# Patient Record
Sex: FEMALE | Race: WHITE | NOT HISPANIC OR LATINO | Employment: FULL TIME | URBAN - METROPOLITAN AREA
[De-identification: names, ages, dates, MRNs, and addresses within clinical notes are randomized per-mention and may not be internally consistent; named-entity substitution may affect disease eponyms.]

---

## 2019-06-28 ENCOUNTER — OFFICE VISIT (OUTPATIENT)
Dept: PODIATRY | Facility: CLINIC | Age: 39
End: 2019-06-28
Payer: COMMERCIAL

## 2019-06-28 VITALS
RESPIRATION RATE: 18 BRPM | TEMPERATURE: 98 F | HEIGHT: 69 IN | BODY MASS INDEX: 21.48 KG/M2 | HEART RATE: 68 BPM | OXYGEN SATURATION: 97 % | WEIGHT: 145 LBS

## 2019-06-28 DIAGNOSIS — M77.52 BURSITIS OF HEEL, LEFT: ICD-10-CM

## 2019-06-28 DIAGNOSIS — B07.0 PLANTAR WART, LEFT FOOT: Primary | ICD-10-CM

## 2019-06-28 PROCEDURE — 99202 OFFICE O/P NEW SF 15 MIN: CPT | Mod: S$GLB,,, | Performed by: PODIATRIST

## 2019-06-28 PROCEDURE — 99999 PR PBB SHADOW E&M-NEW PATIENT-LVL III: ICD-10-PCS | Mod: PBBFAC,,, | Performed by: PODIATRIST

## 2019-06-28 PROCEDURE — 3008F BODY MASS INDEX DOCD: CPT | Mod: S$GLB,,, | Performed by: PODIATRIST

## 2019-06-28 PROCEDURE — 99999 PR PBB SHADOW E&M-NEW PATIENT-LVL III: CPT | Mod: PBBFAC,,, | Performed by: PODIATRIST

## 2019-06-28 PROCEDURE — 99202 PR OFFICE/OUTPT VISIT, NEW, LEVL II, 15-29 MIN: ICD-10-PCS | Mod: S$GLB,,, | Performed by: PODIATRIST

## 2019-06-28 PROCEDURE — 3008F PR BODY MASS INDEX (BMI) DOCUMENTED: ICD-10-PCS | Mod: S$GLB,,, | Performed by: PODIATRIST

## 2019-07-01 NOTE — PROGRESS NOTES
Subjective:      Patient ID: Emili Uribe is a 39 y.o. female.    Chief Complaint: Plantar Warts  Patient presents with complaint of very painful plantar wart left foot.  She states this started February/March of this year, has progressed and is very sore just to touch.  She has tried over-the-counter treatments, PCP has frozen 4 times, no improvement.  She inquires about having it removed.      ROS     Constitutional    Pleasant, well-nourished, no distress, well oriented    Cardiovascular          No chest pain, no shortness of breath,  no swelling in the extremities    Respiratory           No cough, no congestion     Musculoskeletal        No muscle aches, no arthralgias/joint pain, PAIN LEFT HEEL    Neurologic         No weakness, no numbness        Objective:      Physical Exam   Cardiovascular:   Pulses:       Dorsalis pedis pulses are 2+ on the right side, and 2+ on the left side.        Posterior tibial pulses are 2+ on the right side, and 2+ on the left side.   Musculoskeletal:        Right foot: There is no deformity.        Left foot: There is no deformity.   Vascular         Normal CFT bilateral   Pedal skin temperature and color are normal bilateral     Integumentary   Positive pedal edema surrounding/ underlying plantar wart left heel causing a painful bursitis of the area.  Mild hyperkeratotic tissue, area so sensitive, unable to debride adequately.  Lesion is circular, relaxed skin tension lines deviate the area, no erythema or calor  No similar lesions bilateral feet  Skin is in good condition          Neurological   Gross sensation intact bilateral      Musculoskeletal   Muscle Strength/Testing and Tone:  Intact, normal tone bilateral   Joints, Bones, and Muscles: Normal with normal ROM                    Assessment:       Encounter Diagnoses   Name Primary?    Plantar wart, left foot Yes    Bursitis of heel, left          Plan:       Emili was seen today for plantar warts.    Diagnoses and  all orders for this visit:    Plantar wart, left foot    Bursitis of heel, left      Attempted to debride plantar verruca left heel, 2 painful.  Reviewed the etiology and pathology regarding warts with patient.  Advised these are typically not painful unless they become inflamed.  We had a lengthy discussion regarding bursitis of the area.  Explained the patient this is treated with anti-inflammatories.  We discussed over-the-counter oral anti-inflammatory as directed, with food x1 week, discontinue with stomach upset.  Explained to patient best topical treatment to reduce inflammation and pain is I/cool therapy.  We discussed frequency this should be performed.  Also recommended over-the-counter Aspercreme with lidocaine as directed.  Advised patient these treatments can be significantly helpful in reducing pain, swelling and inflammation.  At that point this area can be debrided properly.  Explained the patient due to this location simply freezing the area will not resolve the wart, it has to be aggressively debrided.  Patient confirms area was never debrided before freezing.  Explained the patient conservative treatments would be debriding the area and  Freezing in the office.  We discussed surgical intervention, outpatient surgery, care of the wound left after wart is removed, utilizing surgical shoe or boot.  Recommended surgical consult.  Explained patient if she is doing well and wants to continue conservative treatment at that time she can, or discussed surgical removal.  Patient was in understanding and agreement with treatment plan.  She stated if pain was resolved, she may not even consider having the wart removed.  Encouraged patient to discuss treatment  At time of surgical consult  depending on results of treating bursitis.   Explained the patient if oral and topical anti-inflammatories as well as ice are very successful in reducing her pain over the next few days she can start to treat this area with  over-the-counter treatments, explained patient how to apply for this location. Patient verbalized understanding  Counseled the patient on her conditions, their implications and medical management.  Counseled patient on conditions, their implications and medical management.  Instructed patient to contact the office with any changes, questions, concerns, worsening of symptoms. Patient verbalized understanding.   Total face to face time, exam, assessment, treatment, discussion, documentation 20 minutes, more than half this time spent on consultation and coordination of care.   Follow up   7/15.  Surgical consult Dr. Sincere Corral    This note was created using [employer] ([employer] voice recognition software that occasionally misinterpreted phrases or words.

## 2019-07-15 ENCOUNTER — OFFICE VISIT (OUTPATIENT)
Dept: PODIATRY | Facility: CLINIC | Age: 39
End: 2019-07-15
Payer: COMMERCIAL

## 2019-07-15 VITALS
WEIGHT: 145 LBS | DIASTOLIC BLOOD PRESSURE: 68 MMHG | HEART RATE: 70 BPM | SYSTOLIC BLOOD PRESSURE: 96 MMHG | TEMPERATURE: 98 F | BODY MASS INDEX: 21.48 KG/M2 | HEIGHT: 69 IN

## 2019-07-15 DIAGNOSIS — B07.9 WARTS OF FOOT: Primary | ICD-10-CM

## 2019-07-15 PROCEDURE — 99213 OFFICE O/P EST LOW 20 MIN: CPT | Mod: 25,S$GLB,, | Performed by: PODIATRIST

## 2019-07-15 PROCEDURE — 17110 DESTRUCTION B9 LES UP TO 14: CPT | Mod: LT,S$GLB,, | Performed by: PODIATRIST

## 2019-07-15 PROCEDURE — 99213 PR OFFICE/OUTPT VISIT, EST, LEVL III, 20-29 MIN: ICD-10-PCS | Mod: 25,S$GLB,, | Performed by: PODIATRIST

## 2019-07-15 PROCEDURE — 17110 PR DESTRUCTION BENIGN LESIONS UP TO 14: ICD-10-PCS | Mod: LT,S$GLB,, | Performed by: PODIATRIST

## 2019-07-15 PROCEDURE — 3008F BODY MASS INDEX DOCD: CPT | Mod: S$GLB,,, | Performed by: PODIATRIST

## 2019-07-15 PROCEDURE — 3008F PR BODY MASS INDEX (BMI) DOCUMENTED: ICD-10-PCS | Mod: S$GLB,,, | Performed by: PODIATRIST

## 2019-07-15 PROCEDURE — 99999 PR PBB SHADOW E&M-EST. PATIENT-LVL III: CPT | Mod: PBBFAC,,, | Performed by: PODIATRIST

## 2019-07-15 PROCEDURE — 99999 PR PBB SHADOW E&M-EST. PATIENT-LVL III: ICD-10-PCS | Mod: PBBFAC,,, | Performed by: PODIATRIST

## 2019-07-15 NOTE — LETTER
July 20, 2019      [facility]  [address]  [place] [address]           [facility] - [facility]iat[facility]ound [facility]  [address] [place] [address] [place] [address]  Phone: [phone]  Fax: [phone]          Patient: Emili Uribe   MR Number: [id number]   YOB: 1980   Date of Visit: 7/15/2019       Dear [facility]:    Thank you for referring Emili Uribe to me for evaluation. Attached you will find relevant portions of my assessment and plan of care.    If you have questions, please do not hesitate to call me. I look forward to following Emili Uribe along with you.    Sincerely,    Sincere Corral, DPPALLAVI    Enclosure  CC:  No Recipients    If you would like to receive this communication electronically, please contact [email or web address] or [phone] to request more information on [employer] Link access.    For providers and/or their staff who would like to refer a patient to [facility], please contact us through our one-stop-shop provider referral line, [facility], at [phone].    If you feel you have received this communication in error or would no longer like to receive these types of communications, please e-mail [email or web address]

## 2019-07-20 PROBLEM — B07.9 WARTS OF FOOT: Status: ACTIVE | Noted: 2019-07-20

## 2019-07-20 NOTE — PROGRESS NOTES
Subjective:      Patient ID: Emili Uribe is a 39 y.o. female.    Chief Complaint: Follow-up (plantars wart heel ) and Foot Problem (left foot heel )  Patient presents with complaint of very painful plantar wart left foot.  She states this started February/March of this year, has progressed and is very sore just to touch.  She has tried over-the-counter treatments, PCP has frozen 4 times, no improvement.  She inquires about having it removed.      Review of Systems   Skin: Positive for suspicious lesions.   All other systems reviewed and are negative.       Constitutional    Pleasant, well-nourished, no distress, well oriented    Cardiovascular          No chest pain, no shortness of breath,  no swelling in the extremities    Respiratory           No cough, no congestion     Musculoskeletal        No muscle aches, no arthralgias/joint pain, PAIN LEFT HEEL    Neurologic         No weakness, no numbness        Objective:      Physical Exam   Constitutional: She appears well-developed and well-nourished.   Cardiovascular:   Pulses:       Dorsalis pedis pulses are 2+ on the right side, and 2+ on the left side.        Posterior tibial pulses are 2+ on the right side, and 2+ on the left side.   Pulmonary/Chest: Effort normal.   Musculoskeletal: Normal range of motion.   Feet:   Right Foot:   Protective Sensation: 4 sites tested. 4 sites sensed.   Skin Integrity: Positive for callus and dry skin.   Left Foot:   Protective Sensation: 4 sites tested. 4 sites sensed.   Skin Integrity: Positive for callus and dry skin.   Neurological: She is alert.   Skin: Skin is warm. Capillary refill takes less than 2 seconds.   Psychiatric: She has a normal mood and affect. Her behavior is normal. Judgment and thought content normal.   Nursing note and vitals reviewed.  Vascular         Normal CFT bilateral   Pedal skin temperature and color are normal bilateral     Integumentary   Positive pedal edema surrounding/ underlying plantar  wart left heel causing a painful bursitis of the area.  Mild hyperkeratotic tissue, area so sensitive, unable to debride adequately.  Lesion is circular, relaxed skin tension lines deviate the area, no erythema or calor  No similar lesions bilateral feet  Skin is in good condition          Neurological   Gross sensation intact bilateral      Musculoskeletal   Muscle Strength/Testing and Tone:  Intact, normal tone bilateral   Joints, Bones, and Muscles: Normal with normal ROM                        Assessment:       Encounter Diagnosis   Name Primary?    Warts of foot Yes         Plan:       Emili was seen today for follow-up and foot problem.    Diagnoses and all orders for this visit:    Warts of foot      Patient presents today for follow-up of a wart on the plantar surface of the patient's left foot patient has been referred to me for surgical consultation of this wart that has been present since March.  Previously the area was not able to be debrided because of sensitivity it is not as sensitive today I did non excisionally debride the wart today advising the patient is approximately 1 cm by 8 mm in size it is tender.  Following evaluation and discussion surgical consultation was provided today I advised the patient what would need to be done surgically to remove this however I have also made her aware that she would likely need to be off of her foot for 3-4 weeks postop and this was very concerning to the patient because she works she is on her feet a lot following this I did recommend further conservative treatment patient had this frozen at other office is not our office I have recommended freezing this today patient was in understanding and agreement with this we are going to try to treat this conservatively the area was frozen with the cryoprobe I plan to see her for follow-up in 3 weeks we will pursue surgical intervention only if absolutely necessary and the patient is not responding to conservative  treatment.  Separate evaluation and management performed today as this is the initial time that I have seen the patient I provided surgical consultation and discussion regarding treatment.    This note was created using [employer] voice recognition software that occasionally misinterpreted phrases or words.

## 2019-08-05 ENCOUNTER — OFFICE VISIT (OUTPATIENT)
Dept: PODIATRY | Facility: CLINIC | Age: 39
End: 2019-08-05
Payer: COMMERCIAL

## 2019-08-05 VITALS
WEIGHT: 145 LBS | RESPIRATION RATE: 18 BRPM | TEMPERATURE: 98 F | HEART RATE: 75 BPM | OXYGEN SATURATION: 99 % | HEIGHT: 69 IN | DIASTOLIC BLOOD PRESSURE: 73 MMHG | SYSTOLIC BLOOD PRESSURE: 102 MMHG | BODY MASS INDEX: 21.48 KG/M2

## 2019-08-05 DIAGNOSIS — B07.9 WARTS OF FOOT: Primary | ICD-10-CM

## 2019-08-05 PROCEDURE — 99499 UNLISTED E&M SERVICE: CPT | Mod: S$GLB,,, | Performed by: PODIATRIST

## 2019-08-05 PROCEDURE — 17110 DESTRUCTION B9 LES UP TO 14: CPT | Mod: S$GLB,,, | Performed by: PODIATRIST

## 2019-08-05 PROCEDURE — 99499 NO LOS: ICD-10-PCS | Mod: S$GLB,,, | Performed by: PODIATRIST

## 2019-08-05 PROCEDURE — 99999 PR PBB SHADOW E&M-EST. PATIENT-LVL III: CPT | Mod: PBBFAC,,, | Performed by: PODIATRIST

## 2019-08-05 PROCEDURE — 17110 PR DESTRUCTION BENIGN LESIONS UP TO 14: ICD-10-PCS | Mod: S$GLB,,, | Performed by: PODIATRIST

## 2019-08-05 PROCEDURE — 99999 PR PBB SHADOW E&M-EST. PATIENT-LVL III: ICD-10-PCS | Mod: PBBFAC,,, | Performed by: PODIATRIST

## 2019-08-05 NOTE — LETTER
August 10, 2019      [facility]  [address]  [place] [address]           [facility]ound [facility]  [address] [place] [address] [place] [address]  Phone: [phone]  Fax: [phone]          Patient: Emili Uribe   MR Number: [id number]   YOB: 1980   Date of Visit: 8/5/2019       Dear [facility]:    Thank you for referring Emili Uribe to me for evaluation. Attached you will find relevant portions of my assessment and plan of care.    If you have questions, please do not hesitate to call me. I look forward to following Emili Uribe along with you.    Sincerely,    Sincere Corral, DPPALLAVI    Enclosure  CC:  No Recipients    If you would like to receive this communication electronically, please contact [email or web address] or [phone] to request more information on [employer] access.    For providers and/or their staff who would like to refer a patient to [facility], please contact us through our one-stop-shop provider referral line, [facility], at [phone].    If you feel you have received this communication in error or would no longer like to receive these types of communications, please e-mail [email or web address]

## 2019-08-10 NOTE — PROGRESS NOTES
Subjective:      Patient ID: Emili Uribe is a 39 y.o. female.    Chief Complaint: Follow-up  Patient presents with complaint of very painful plantar wart left foot.  She states this started February/March of this year, has progressed and is very sore just to touch.  She has tried over-the-counter treatments, PCP has frozen 4 times, no improvement.  She inquires about having it removed.      Review of Systems   Skin: Positive for suspicious lesions.   All other systems reviewed and are negative.       Constitutional    Pleasant, well-nourished, no distress, well oriented    Cardiovascular          No chest pain, no shortness of breath,  no swelling in the extremities    Respiratory           No cough, no congestion     Musculoskeletal        No muscle aches, no arthralgias/joint pain, PAIN LEFT HEEL    Neurologic         No weakness, no numbness        Objective:      Physical Exam   Constitutional: She appears well-developed and well-nourished.   Cardiovascular:   Pulses:       Dorsalis pedis pulses are 2+ on the right side, and 2+ on the left side.        Posterior tibial pulses are 2+ on the right side, and 2+ on the left side.   Pulmonary/Chest: Effort normal.   Musculoskeletal: Normal range of motion.   Feet:   Right Foot:   Protective Sensation: 4 sites tested. 4 sites sensed.   Skin Integrity: Positive for callus and dry skin.   Left Foot:   Protective Sensation: 4 sites tested. 4 sites sensed.   Skin Integrity: Positive for callus and dry skin.   Neurological: She is alert.   Skin: Skin is warm. Capillary refill takes less than 2 seconds.   Psychiatric: She has a normal mood and affect. Her behavior is normal. Judgment and thought content normal.   Nursing note and vitals reviewed.  Vascular         Normal CFT bilateral   Pedal skin temperature and color are normal bilateral     Integumentary   Positive pedal edema surrounding/ underlying plantar wart left heel causing a painful bursitis of the area.   Mild hyperkeratotic tissue, area so sensitive, unable to debride adequately.  Lesion is circular, relaxed skin tension lines deviate the area, no erythema or calor  No similar lesions bilateral feet  Skin is in good condition          Neurological   Gross sensation intact bilateral      Musculoskeletal   Muscle Strength/Testing and Tone:  Intact, normal tone bilateral   Joints, Bones, and Muscles: Normal with normal ROM                            Assessment:       Encounter Diagnosis   Name Primary?    Warts of foot Yes         Plan:       Emili was seen today for follow-up.    Diagnoses and all orders for this visit:    Warts of foot      Patient presents today for follow-up of a wart on the plantar surface of the patient's left foot patient has been referred to me for surgical consultation of this wart that has been present since March.  Previously the area was not able to be debrided because of sensitivity it is not as sensitive today I did non excisionally debride the wart today advising the patient is approximately 1 cm by 8 mm in size it is tender.  Following evaluation and discussion surgical consultation was provided today I advised the patient what would need to be done surgically to remove this however I have also made her aware that she would likely need to be off of her foot for 3-4 weeks postop and this was very concerning to the patient because she works she is on her feet a lot following this I did recommend further conservative treatment patient had this frozen at other office is not our office I have recommended freezing this today patient was in understanding and agreement with this we are going to try to treat this conservatively the area was frozen with the cryoprobe I plan to see her for follow-up in 3 weeks we will pursue surgical intervention only if absolutely necessary and the patient is not responding to conservative treatment.  The wart on the patient's left heel has gotten significantly  smaller it is decreased in overall size to about 4 mm in the patient has had significantly decreased discomfort.  This was treated again utilizing the cryoprobe patient also had a new wart present on the right heel this was also treated utilizing the cryoprobe it is much smaller and is newly presented.    This note was created using [employer] voice recognition software that occasionally misinterpreted phrases or words.

## 2019-08-27 ENCOUNTER — OFFICE VISIT (OUTPATIENT)
Dept: PODIATRY | Facility: CLINIC | Age: 39
End: 2019-08-27
Payer: COMMERCIAL

## 2019-08-27 VITALS — WEIGHT: 145 LBS | BODY MASS INDEX: 21.48 KG/M2 | HEIGHT: 69 IN

## 2019-08-27 DIAGNOSIS — B07.9 WARTS OF FOOT: Primary | ICD-10-CM

## 2019-08-27 PROCEDURE — 3008F PR BODY MASS INDEX (BMI) DOCUMENTED: ICD-10-PCS | Mod: S$GLB,,, | Performed by: PODIATRIST

## 2019-08-27 PROCEDURE — 99212 OFFICE O/P EST SF 10 MIN: CPT | Mod: S$GLB,,, | Performed by: PODIATRIST

## 2019-08-27 PROCEDURE — 99212 PR OFFICE/OUTPT VISIT, EST, LEVL II, 10-19 MIN: ICD-10-PCS | Mod: S$GLB,,, | Performed by: PODIATRIST

## 2019-08-27 PROCEDURE — 99999 PR PBB SHADOW E&M-EST. PATIENT-LVL III: ICD-10-PCS | Mod: PBBFAC,,, | Performed by: PODIATRIST

## 2019-08-27 PROCEDURE — 3008F BODY MASS INDEX DOCD: CPT | Mod: S$GLB,,, | Performed by: PODIATRIST

## 2019-08-27 PROCEDURE — 99999 PR PBB SHADOW E&M-EST. PATIENT-LVL III: CPT | Mod: PBBFAC,,, | Performed by: PODIATRIST

## 2019-08-27 NOTE — LETTER
September 1, 2019      [facility]  [address]  [place] [address]           [facility] - Podiatry/Wound Care  [address]  [place] [address]  Phone: [phone]  Fax: [phone]          Patient: Emili Uribe   MR Number: [id number]   YOB: 1980   Date of Visit: 8/27/2019       Dear [facility]:    Thank you for referring Emili Uribe to me for evaluation. Attached you will find relevant portions of my assessment and plan of care.    If you have questions, please do not hesitate to call me. I look forward to following Emili Uribe along with you.    Sincerely,    Sincere Croral, DPPALLAVI    Enclosure  CC:  No Recipients    If you would like to receive this communication electronically, please contact [email or web address] or [phone] to request more information on [employer] Link access.    For providers and/or their staff who would like to refer a patient to [facility], please contact us through our one-stop-shop provider referral line, [facility], at [phone].    If you feel you have received this communication in error or would no longer like to receive these types of communications, please e-mail [email or web address]

## 2019-09-01 NOTE — PROGRESS NOTES
Subjective:      Patient ID: Emili Uribe is a 39 y.o. female.    Chief Complaint: Follow-up (wart bottom both feet ) and Foot Problem  Patient presents with complaint of very painful plantar wart left foot.  She states this started February/March of this year, has progressed and is very sore just to touch.  She has tried over-the-counter treatments, PCP has frozen 4 times, no improvement.  She inquires about having it removed.      Review of Systems   Skin: Positive for suspicious lesions.   All other systems reviewed and are negative.       Constitutional    Pleasant, well-nourished, no distress, well oriented    Cardiovascular          No chest pain, no shortness of breath,  no swelling in the extremities    Respiratory           No cough, no congestion     Musculoskeletal        No muscle aches, no arthralgias/joint pain, PAIN LEFT HEEL    Neurologic         No weakness, no numbness        Objective:      Physical Exam   Constitutional: She appears well-developed and well-nourished.   Cardiovascular:   Pulses:       Dorsalis pedis pulses are 2+ on the right side, and 2+ on the left side.        Posterior tibial pulses are 2+ on the right side, and 2+ on the left side.   Pulmonary/Chest: Effort normal.   Musculoskeletal: Normal range of motion.   Feet:   Right Foot:   Protective Sensation: 4 sites tested. 4 sites sensed.   Skin Integrity: Positive for callus and dry skin.   Left Foot:   Protective Sensation: 4 sites tested. 4 sites sensed.   Skin Integrity: Positive for callus and dry skin.   Neurological: She is alert.   Skin: Skin is warm. Capillary refill takes less than 2 seconds.   Psychiatric: She has a normal mood and affect. Her behavior is normal. Judgment and thought content normal.   Nursing note and vitals reviewed.  Vascular         Normal CFT bilateral   Pedal skin temperature and color are normal bilateral     Integumentary   Positive pedal edema surrounding/ underlying plantar wart left heel  causing a painful bursitis of the area.  Mild hyperkeratotic tissue, area so sensitive, unable to debride adequately.  Lesion is circular, relaxed skin tension lines deviate the area, no erythema or calor  No similar lesions bilateral feet  Skin is in good condition          Neurological   Gross sensation intact bilateral      Musculoskeletal   Muscle Strength/Testing and Tone:  Intact, normal tone bilateral   Joints, Bones, and Muscles: Normal with normal ROM                                    Assessment:       Encounter Diagnosis   Name Primary?    Warts of foot Yes         Plan:       Emili was seen today for follow-up and foot problem.    Diagnoses and all orders for this visit:    Warts of foot      Patient presents today for follow-up plantar is warts bilateral heel.  Patient states she is doing great she is not having any pain or discomfort at all further evaluation of the sites reveal that the small wart on the plantar right heel is completely resolved following non excisional debridement of the callus skin and tissue there is healthy skin underlying this area previously noted larger wart on the plantar left heel is also completely resolved following debridement non excisional both of these areas display healthy new pink skin and tissue no active wart is noted in the area patient is going to keep the skin well hydrated monitor these areas closely I have advised patient if there is any question as to whether not the warts are coming back she is to contact me immediately for follow-up further evaluation at this point it appears that the walls warts have completely resolved.  Follow-up as needed.    This note was created using [employer] voice recognition software that occasionally misinterpreted phrases or words.